# Patient Record
Sex: FEMALE | Race: WHITE | HISPANIC OR LATINO | URBAN - METROPOLITAN AREA
[De-identification: names, ages, dates, MRNs, and addresses within clinical notes are randomized per-mention and may not be internally consistent; named-entity substitution may affect disease eponyms.]

---
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
## 2025-05-30 ENCOUNTER — OFFICE VISIT (OUTPATIENT)
Dept: URGENT CARE | Facility: PHYSICIAN GROUP | Age: 20
End: 2025-05-30
Payer: OTHER GOVERNMENT

## 2025-05-30 ENCOUNTER — RESULTS FOLLOW-UP (OUTPATIENT)
Dept: URGENT CARE | Facility: PHYSICIAN GROUP | Age: 20
End: 2025-05-30

## 2025-05-30 VITALS
OXYGEN SATURATION: 96 % | DIASTOLIC BLOOD PRESSURE: 74 MMHG | SYSTOLIC BLOOD PRESSURE: 112 MMHG | TEMPERATURE: 98.1 F | RESPIRATION RATE: 16 BRPM | HEIGHT: 62 IN | HEART RATE: 72 BPM | BODY MASS INDEX: 22.26 KG/M2 | WEIGHT: 121 LBS

## 2025-05-30 DIAGNOSIS — J02.9 SORE THROAT: Primary | ICD-10-CM

## 2025-05-30 DIAGNOSIS — K12.1 MOUTH ULCERS: ICD-10-CM

## 2025-05-30 DIAGNOSIS — B00.1 COLD SORE: ICD-10-CM

## 2025-05-30 LAB — S PYO DNA SPEC NAA+PROBE: NOT DETECTED

## 2025-05-30 RX ORDER — VALACYCLOVIR HYDROCHLORIDE 1 G/1
2000 TABLET, FILM COATED ORAL 2 TIMES DAILY
Qty: 4 TABLET | Refills: 0 | Status: SHIPPED | OUTPATIENT
Start: 2025-05-30 | End: 2025-05-31

## 2025-05-30 ASSESSMENT — ENCOUNTER SYMPTOMS
HEADACHES: 1
NAUSEA: 1
COUGH: 0
FEVER: 1
SORE THROAT: 1
CHILLS: 1

## 2025-05-30 NOTE — PROGRESS NOTES
"Subjective     Mechelle Doyle is a 20 y.o. female who presents with Pharyngitis (Sore throat, headache, nausea, fatigue, body tense and body aches, fever, was taking antibiotics and cold medicine to help with fever, back of throat pain, white spots on back of throat, tonsils swollen, now has a cold sore  X Sx started 3 days ago )            Patient is a 20-year-old female presents to urgent care with sore throat remote history of subjective fevers with bodyaches.  Patient reports symptoms began approximately 3 days ago with bodyaches, headache, fatigue nausea and subjective fevers.  Since then she has developed sore throat where she noticed a \"cold sore \"to the back of her throat this quickly improved after utilization of hydrogen peroxide gargles and then noticed a cold sore that developed on her lower lip.  Patient does report prior history of cold sores in the past of which this feels similar.  She also notes that she started on antibiotics yesterday from a [facility] pharmacy she is uncertain the name of the antibiotics however this did not improve symptoms.  Patient is otherwise healthy.    Pharyngitis   This is a new problem. The current episode started in the past 7 days. The pain is worse on the left side. Associated symptoms include headaches. Pertinent negatives include no congestion or coughing.       Review of Systems   Constitutional:  Positive for chills, fever and malaise/fatigue.   HENT:  Positive for sore throat. Negative for congestion.    Respiratory:  Negative for cough.    Gastrointestinal:  Positive for nausea.   Neurological:  Positive for headaches.              Objective     /74 (BP Location: Right arm, Patient Position: Sitting, BP Cuff Size: Adult)   Pulse 72   Temp 36.7 °C (98.1 °F) (Temporal)   Resp 16   Ht 1.575 m (5' 2\")   Wt 54.9 kg (121 lb)   SpO2 96%   BMI 22.13 kg/m²    PMH:  has no past medical history on file.  MEDS: Reviewed .   ALLERGIES: Allergies[1]  SURGHX: Past " Surgical History[2]  SOCHX:  reports that she has never smoked. She has never used smokeless tobacco. She reports that she does not drink alcohol and does not use drugs.  FH: Family history was reviewed, no pertinent findings to report    Physical Exam  Vitals reviewed.   Constitutional:       Appearance: She is well-developed.   HENT:      Head: Normocephalic and atraumatic.      Comments: Lower lip with blisterlike lesion and mild swelling.     Right Ear: External ear normal.      Left Ear: External ear normal.      Nose: Nose normal.      Mouth/Throat:      Pharynx: Uvula midline. Posterior oropharyngeal erythema present. No oropharyngeal exudate.      Tonsils: No tonsillar abscesses.      Comments: Uvula is midline without ulcerative changes to the posterior oropharynx only mild erythema.  Eyes:      Pupils: Pupils are equal, round, and reactive to light.   Neck:      Thyroid: No thyromegaly.   Cardiovascular:      Rate and Rhythm: Normal rate and regular rhythm.   Pulmonary:      Effort: Pulmonary effort is normal. No respiratory distress.      Breath sounds: Normal breath sounds.   Musculoskeletal:         General: Normal range of motion.      Cervical back: Normal range of motion and neck supple.   Lymphadenopathy:      Cervical: No cervical adenopathy.   Skin:     General: Skin is warm.      Coloration: Skin is not pale.      Findings: No rash.      Comments: No rash to the hands or forearms.   Neurological:      Mental Status: She is alert and oriented to person, place, and time.   Psychiatric:         Behavior: Behavior normal.                                  Assessment & Plan  Sore throat    Orders:    POCT GROUP A STREP, PCR    Mouth ulcers         Cold sore    Orders:    valacyclovir (VALTREX) 1 GM Tab; Take 2 Tablets by mouth 2 times a day for 1 day.             It was explained today that due to the viral nature of the pt's illness, we will treat symptomatically today.  Strep was conducted at the  request of patient today which was negative patient was notified via [employer].  Valtrex was written for cold sore although I do feel this may be ulcerative changes from potential herpangina as well.  No current mouth ulcers noted although based on patient's description this is very consistent with such.  Discussed the contagious nature of symptoms at this time with patient and to avoid sharing cups, utensils etc.  No evidence of bacterial infection on exam. Lungs are clear, Sats are WNL, TMs clear, neck is supple, pt. Is well appearing- non-toxic.   Encouraged OTC supportive meds PRN. Humidification, increase fluids, avoid night time dairy.   Discussed side effects of OTC meds and any prescribed.  Given precautionary s/sx that mandate immediate follow up and evaluation in the ED. Advised of risks of not doing so.    DDX, Supportive care, and indications for immediate follow-up discussed with patient.    Instructed to return to clinic or nearest emergency department if we are not available for any change in condition, further concerns, or worsening of symptoms.    The patient  and/or guardian demonstrated a good understanding and agreed with the treatment plan.    Please note that this dictation was created using voice recognition software. I have made every reasonable attempt to correct obvious errors, but I expect that there are errors of grammar and possibly content that I did not discover before finalizing the note.                 [1] No Known Allergies  [2] No past surgical history on file.

## 2025-05-30 NOTE — LETTER
May 30, 2025         Patient: Mechelle Doyle   YOB: 2005   Date of Visit: 5/30/2025           To Whom it May Concern:    Mechelle Doyle was seen in my clinic on 5/30/2025. Please excuse this patient from work due to recent ailment.   If you have any questions or concerns, please don't hesitate to call.        Sincerely,           Hakeem Taylor P.A.-C.  Electronically Signed